# Patient Record
Sex: MALE | Race: WHITE | Employment: OTHER | URBAN - METROPOLITAN AREA
[De-identification: names, ages, dates, MRNs, and addresses within clinical notes are randomized per-mention and may not be internally consistent; named-entity substitution may affect disease eponyms.]

---

## 2019-12-15 NOTE — PROGRESS NOTES
Called to pre-assess for BEAR/Cardioversion with Dr Saranya Chawla , Scheduled 12/16/19.  No answer & no voicemail set up - HOme # [phone] - is a wrong number

## 2019-12-16 ENCOUNTER — HOSPITAL ENCOUNTER (OUTPATIENT)
Dept: CARDIAC CATH/INVASIVE PROCEDURES | Age: 84
Discharge: HOME | End: 2019-12-16
Attending: INTERNAL MEDICINE | Admitting: INTERNAL MEDICINE
Payer: COMMERCIAL

## 2019-12-16 VITALS
OXYGEN SATURATION: 96 % | HEIGHT: 70 IN | RESPIRATION RATE: 16 BRPM | DIASTOLIC BLOOD PRESSURE: 103 MMHG | SYSTOLIC BLOOD PRESSURE: 134 MMHG | HEART RATE: 77 BPM | BODY MASS INDEX: 20.81 KG/M2

## 2019-12-16 LAB
ALBUMIN SERPL-MCNC: 3.4 G/DL (ref 3.2–4.6)
ALBUMIN/GLOB SERPL: 0.9 {RATIO} (ref 1.2–3.5)
ALP SERPL-CCNC: 61 U/L (ref 50–136)
ALT SERPL-CCNC: 18 U/L (ref 12–65)
ANION GAP SERPL CALC-SCNC: 5 MMOL/L (ref 7–16)
AST SERPL-CCNC: 25 U/L (ref 15–37)
ATRIAL RATE: 441 BPM
ATRIAL RATE: 77 BPM
BILIRUB SERPL-MCNC: 0.7 MG/DL (ref 0.2–1.1)
BUN SERPL-MCNC: 30 MG/DL (ref 8–23)
CALCIUM SERPL-MCNC: 9.2 MG/DL (ref 8.3–10.4)
CALCULATED P AXIS, ECG09: 107 DEGREES
CALCULATED P AXIS, ECG09: 84 DEGREES
CALCULATED R AXIS, ECG10: -61 DEGREES
CALCULATED R AXIS, ECG10: -65 DEGREES
CALCULATED T AXIS, ECG11: 93 DEGREES
CALCULATED T AXIS, ECG11: 93 DEGREES
CHLORIDE SERPL-SCNC: 105 MMOL/L (ref 98–107)
CO2 SERPL-SCNC: 28 MMOL/L (ref 21–32)
CREAT SERPL-MCNC: 1.74 MG/DL (ref 0.8–1.5)
DIAGNOSIS, 93000: NORMAL
DIAGNOSIS, 93000: NORMAL
ERYTHROCYTE [DISTWIDTH] IN BLOOD BY AUTOMATED COUNT: 17.2 % (ref 11.9–14.6)
GLOBULIN SER CALC-MCNC: 4 G/DL (ref 2.3–3.5)
GLUCOSE SERPL-MCNC: 95 MG/DL (ref 65–100)
HCT VFR BLD AUTO: 40.6 % (ref 41.1–50.3)
HGB BLD-MCNC: 12.9 G/DL (ref 13.6–17.2)
INR PPP: 1.5
MAGNESIUM SERPL-MCNC: 2 MG/DL (ref 1.8–2.4)
MCH RBC QN AUTO: 30.4 PG (ref 26.1–32.9)
MCHC RBC AUTO-ENTMCNC: 31.8 G/DL (ref 31.4–35)
MCV RBC AUTO: 95.8 FL (ref 79.6–97.8)
NRBC # BLD: 0 K/UL (ref 0–0.2)
P-R INTERVAL, ECG05: 276 MS
PLATELET # BLD AUTO: 228 K/UL (ref 150–450)
PMV BLD AUTO: 9.5 FL (ref 9.4–12.3)
POTASSIUM SERPL-SCNC: 4.6 MMOL/L (ref 3.5–5.1)
PROT SERPL-MCNC: 7.4 G/DL (ref 6.3–8.2)
PROTHROMBIN TIME: 18.5 SEC (ref 11.7–14.5)
Q-T INTERVAL, ECG07: 430 MS
Q-T INTERVAL, ECG07: 442 MS
QRS DURATION, ECG06: 138 MS
QRS DURATION, ECG06: 142 MS
QTC CALCULATION (BEZET), ECG08: 483 MS
QTC CALCULATION (BEZET), ECG08: 500 MS
RBC # BLD AUTO: 4.24 M/UL (ref 4.23–5.6)
SODIUM SERPL-SCNC: 138 MMOL/L (ref 136–145)
VENTRICULAR RATE, ECG03: 76 BPM
VENTRICULAR RATE, ECG03: 77 BPM
WBC # BLD AUTO: 6.7 K/UL (ref 4.3–11.1)

## 2019-12-16 PROCEDURE — 92960 CARDIOVERSION ELECTRIC EXT: CPT

## 2019-12-16 PROCEDURE — 99153 MOD SED SAME PHYS/QHP EA: CPT

## 2019-12-16 PROCEDURE — 93005 ELECTROCARDIOGRAM TRACING: CPT | Performed by: INTERNAL MEDICINE

## 2019-12-16 PROCEDURE — 74011250636 HC RX REV CODE- 250/636: Performed by: INTERNAL MEDICINE

## 2019-12-16 PROCEDURE — 77030009397 HC ELECTRD ECG PACE ZOLL -B

## 2019-12-16 PROCEDURE — 93312 ECHO TRANSESOPHAGEAL: CPT

## 2019-12-16 PROCEDURE — 85610 PROTHROMBIN TIME: CPT

## 2019-12-16 PROCEDURE — 74011250637 HC RX REV CODE- 250/637: Performed by: INTERNAL MEDICINE

## 2019-12-16 PROCEDURE — 80053 COMPREHEN METABOLIC PANEL: CPT

## 2019-12-16 PROCEDURE — 99152 MOD SED SAME PHYS/QHP 5/>YRS: CPT

## 2019-12-16 PROCEDURE — 83735 ASSAY OF MAGNESIUM: CPT

## 2019-12-16 PROCEDURE — 85027 COMPLETE CBC AUTOMATED: CPT

## 2019-12-16 RX ORDER — SODIUM CHLORIDE 9 MG/ML
75 INJECTION, SOLUTION INTRAVENOUS CONTINUOUS
Status: DISCONTINUED | OUTPATIENT
Start: 2019-12-16 | End: 2019-12-16 | Stop reason: HOSPADM

## 2019-12-16 RX ORDER — MIDAZOLAM HYDROCHLORIDE 1 MG/ML
.5-5 INJECTION, SOLUTION INTRAMUSCULAR; INTRAVENOUS
Status: DISCONTINUED | OUTPATIENT
Start: 2019-12-16 | End: 2019-12-16 | Stop reason: HOSPADM

## 2019-12-16 RX ADMIN — MIDAZOLAM 2 MG: 1 INJECTION INTRAMUSCULAR; INTRAVENOUS at 09:05

## 2019-12-16 RX ADMIN — APIXABAN 2.5 MG: 2.5 TABLET, FILM COATED ORAL at 08:28

## 2019-12-16 RX ADMIN — MIDAZOLAM 1 MG: 1 INJECTION INTRAMUSCULAR; INTRAVENOUS at 09:32

## 2019-12-16 NOTE — PROGRESS NOTES
BEAR/CVN DR Raffy Cardenas  ASA II Mallampati II  Versed 3mg  One shock @200j synced  Now nsr  Pt tolerated procedure well

## 2019-12-16 NOTE — DISCHARGE INSTRUCTIONS
AFTER YOU TRANSESOPHAGEAL ECHOCARDIOGRAM    Be sure someone else drives you home. You may feel drowsy for several hours. Do not eat or drink for at least two hours after your procedure. Your throat will be numb and there is a risk you might have difficulty swallowing for a while. Be careful when you do eat or drink for the first time especially with hot fluids since you could easily burn your throat. Call your doctor if:    · You are bleeding from your throat or mouth. · You have trouble breathing all of a sudden. · You have chest pain or any pain that spreads to your neck, jaw, or arms. · You have questions or concerns. · You have a fever greater than 101°F.    Doctor: [facility] Cardiology [phone]    Special Instructions:    No driving for 24 hours. Patient Education        Electrical Cardioversion: What to Expect at Home  Your Recovery    Electrical cardioversion is a treatment for an abnormal heartbeat, such as atrial fibrillation, supraventricular tachycardia, or ventricular tachycardia (VT). It uses a brief electrical shock to reset your heart's rhythm. After cardioversion, you may have redness, like a sunburn, where the patches were. The medicines you got to make you sleepy may make you feel drowsy for the rest of the day. Your doctor may have you take medicines to help the heart beat normally and to prevent blood clots. This care sheet gives you a general idea about how long it will take for you to recover. But each person recovers at a different pace. Follow the steps below to feel better as quickly as possible. How can you care for yourself at home? Medicines    · Be safe with medicines. Take your medicines exactly as prescribed. Call your doctor if you think you are having a problem with your medicine. You may take one or more of the following medicines:  ? Rate-control medicines to slow the heart rate. These include beta-blockers, calcium channel blockers, and digoxin. ?  Rhythm control medicines that help the heart keep a normal rhythm. ? Blood thinners, also called anticoagulants, which help prevent blood clots. You will get more details on the specific medicines your doctor prescribes. Be sure you know how to take your medicines safely.     · Do not take any vitamins, over-the-counter medicines, or herbal products without talking to your doctor first.   Exercise    · Start light exercise if your doctor says that it is okay. Even a small amount will help you get stronger, have more energy, and manage your stress. Walking is an easy way to get exercise. Start out by walking a little more than you did in the hospital. Bit by bit, increase the amount you walk.     · When you exercise, watch for signs that your heart is working too hard. You are pushing too hard if you cannot talk while you are exercising. If you become short of breath or dizzy or have chest pain, sit down and rest right away.     · Check your pulse regularly. Place two fingers on the artery at the palm side of your wrist in line with your thumb. If your heartbeat seems uneven or fast, talk to your doctor. Other instructions    · Ask your doctor when you can drive again.     · Do not smoke. If you need help quitting, talk to your doctor about stop-smoking programs and medicines. These can increase your chances of quitting for good.     · Limit alcohol. Follow-up care is a key part of your treatment and safety. Be sure to make and go to all appointments, and call your doctor if you are having problems. It's also a good idea to know your test results and keep a list of the medicines you take. When should you call for help? Call 911 anytime you think you may need emergency care. For example, call if:    · You passed out (lost consciousness).     · You have chest pain or pressure. This may occur with:  ? Sweating. ? Shortness of breath. ? Nausea or vomiting.   ? Pain that spreads from the chest to the neck, jaw, or one or both shoulders or arms. ? A fast or uneven pulse. After calling 911, the  may tell you to chew 1 adult-strength or 2 to 4 low-dose aspirin. Wait for an ambulance. Do not try to drive yourself.     · You have symptoms of a stroke. These may include:  ? Sudden numbness, tingling, weakness, or loss of movement in your face, arm, or leg, especially on only one side of your body. ? Sudden vision changes. ? Sudden trouble speaking. ? Sudden confusion or trouble understanding simple statements. ? Sudden problems with walking or balance. ? A sudden, severe headache that is different from past headaches.    Call your doctor now or seek immediate medical care if:    · You feel dizzy or lightheaded, or you feel like you may faint.     · You have a fast or irregular heartbeat.    Watch closely for any changes in your health, and be sure to contact your doctor if you have any problems. Where can you learn more? Go to http://rocco-malka.info/. Enter A617 in the search box to learn more about \"Electrical Cardioversion: What to Expect at Home. \"  Current as of: April 9, 2019  Content Version: 12.2  © 8301-0177 [employer]. Care instructions adapted under license by [employer] (which disclaims liability or warranty for this information). If you have questions about a medical condition or this instruction, always ask your healthcare professional. [place] [address] any warranty or liability for your use of this information.

## 2019-12-16 NOTE — PROCEDURES
Brief Cardiac Procedure Note    Patient: Eleno Pace MRN: [id number]  SSN: [id number]    YOB: 1932  Age: 80 y.o. Sex: male      Date of Procedure: 12/16/2019     Pre-procedure Diagnosis: Atrial Fibrillation/Atrial Flutter    Post-procedure Diagnosis:  Atrial Fibrillation/Atrial Flutter    Reason for Procedure: Cardiac Arrhythmia    Procedure: Transesophageal Echocardiogram with Cardioversion    Brief Description of Procedure:     Performed By: García Nolen MD     Assistants:     Anesthesia: Moderate Sedation    Estimated Blood Loss: Less than 10 mL      Specimens: None    Implants: None    Findings:   No amado clot  nsr after 351 joules    Complications: None    Recommendations: Continue medical therapy.     Signed By: García Nolen MD     December 16, 2019

## 2019-12-16 NOTE — PROGRESS NOTES
Patient received to [address] [place] [address] room # 8  Ambulatory from [facility]. Patient scheduled for BEAR/CVN today with Dr Courtney Roes. Procedure reviewed & questions answered, voiced good understanding consent obtained & placed on chart. All medications and medical history reviewed. Will prep patient per orders. Patient & family updated on plan of care. The patient has a fraility score of 6-MODERATELY FRAIL, based on patient in rehab for falls using walker. Both he and his wife are poor historians.

## 2019-12-16 NOTE — PROGRESS NOTES
TRANSFER - IN REPORT:    Verbal report received from [facility] on Berenda Door  being received from cath lab(unit) for routine progression of care      Report consisted of patients Situation, Background, Assessment and   Recommendations(SBAR). Information from the following report(s) Procedure Summary was reviewed with the receiving nurse. Opportunity for questions and clarification was provided. Assessment completed upon patients arrival to unit and care assumed.

## 2019-12-17 NOTE — PROCEDURES
[facility]  CARDIAC CATH    Name:  Nilda Ram  MR#:  [id number]  :  1932  ACCOUNT #:  [de-identified]  DATE OF SERVICE:  2019    PROCEDURES PERFORMED:  1. Transesophageal echocardiogram.  2.  Electrical cardioversion. PREOPERATIVE DIAGNOSIS:  Persistent atrial fibrillation. POSTOPERATIVE DIAGNOSIS:  Persistent atrial fibrillation. SURGEON:  Theron Ayala MD    ASSISTANT:  None. ESTIMATED BLOOD LOSS:  None. SPECIMENS REMOVED:  None. COMPLICATIONS:  None. IMPLANTS:  None. ANESTHESIA:  Conscious sedation administered by Judie Love RN. HISTORY:  This is an 77-year-old gentleman with persistent poorly controlled atrial fibrillation. PROCEDURE:  After adequate oropharyngeal topical anesthesia with Cetacaine, the patient was given intravenous Versed. In spite of a persistent effort, the transesophageal probe would not advance into the esophagus. This was changed out for a pediatric probe which was able to be positioned into the mid-esophagus. A limited transesophageal echocardiogram was able to be performed. The left atrium was well visualized and there was no clot identified. See the separate report for details. After the confirmation of no clot in the left atrial appendage, the patient was shocked from atrial fibrillation to normal sinus rhythm with 200 joules. He tolerated it well.       Reggie Carver MD      [id number]  D:  2019 12:46  T:  2019 6:45  JOB #:  [id number]

## 2020-03-16 ENCOUNTER — HOSPITAL ENCOUNTER (OUTPATIENT)
Dept: LAB | Age: 85
Discharge: HOME | End: 2020-03-16
Payer: MEDICARE

## 2020-03-16 DIAGNOSIS — I50.20 HEART FAILURE WITH REDUCED LEFT VENTRICULAR FUNCTION (HCC): ICD-10-CM

## 2020-03-16 LAB
ANION GAP SERPL CALC-SCNC: 5 MMOL/L (ref 7–16)
BNP SERPL-MCNC: 359 PG/ML
BUN SERPL-MCNC: 29 MG/DL (ref 8–23)
CALCIUM SERPL-MCNC: 8.7 MG/DL (ref 8.3–10.4)
CHLORIDE SERPL-SCNC: 102 MMOL/L (ref 98–107)
CO2 SERPL-SCNC: 31 MMOL/L (ref 21–32)
CREAT SERPL-MCNC: 2 MG/DL (ref 0.8–1.5)
GLUCOSE SERPL-MCNC: 78 MG/DL (ref 65–100)
POTASSIUM SERPL-SCNC: 4.3 MMOL/L (ref 3.5–5.1)
SODIUM SERPL-SCNC: 138 MMOL/L (ref 136–145)

## 2020-03-16 PROCEDURE — 36415 COLL VENOUS BLD VENIPUNCTURE: CPT

## 2020-03-16 PROCEDURE — 80048 BASIC METABOLIC PNL TOTAL CA: CPT

## 2020-03-16 PROCEDURE — 83880 ASSAY OF NATRIURETIC PEPTIDE: CPT

## 2020-06-26 ENCOUNTER — HOSPITAL ENCOUNTER (OUTPATIENT)
Dept: LAB | Age: 85
Discharge: HOME | End: 2020-06-26
Payer: MEDICARE

## 2020-06-26 DIAGNOSIS — I48.0 PAROXYSMAL ATRIAL FIBRILLATION (HCC): ICD-10-CM

## 2020-06-26 LAB
ALBUMIN SERPL-MCNC: 3.9 G/DL (ref 3.2–4.6)
ALBUMIN/GLOB SERPL: 1.1 {RATIO} (ref 1.2–3.5)
ALP SERPL-CCNC: 66 U/L (ref 50–136)
ALT SERPL-CCNC: 28 U/L (ref 12–65)
ANION GAP SERPL CALC-SCNC: 4 MMOL/L (ref 7–16)
AST SERPL-CCNC: 26 U/L (ref 15–37)
BILIRUB SERPL-MCNC: 0.8 MG/DL (ref 0.2–1.1)
BUN SERPL-MCNC: 33 MG/DL (ref 8–23)
CALCIUM SERPL-MCNC: 8.5 MG/DL (ref 8.3–10.4)
CHLORIDE SERPL-SCNC: 104 MMOL/L (ref 98–107)
CO2 SERPL-SCNC: 29 MMOL/L (ref 21–32)
CREAT SERPL-MCNC: 2.12 MG/DL (ref 0.8–1.5)
GLOBULIN SER CALC-MCNC: 3.6 G/DL (ref 2.3–3.5)
GLUCOSE SERPL-MCNC: 111 MG/DL (ref 65–100)
POTASSIUM SERPL-SCNC: 4 MMOL/L (ref 3.5–5.1)
PROT SERPL-MCNC: 7.5 G/DL (ref 6.3–8.2)
SODIUM SERPL-SCNC: 137 MMOL/L (ref 136–145)
TSH SERPL DL<=0.005 MIU/L-ACNC: 18.5 UIU/ML (ref 0.36–3.74)

## 2020-06-26 PROCEDURE — 84443 ASSAY THYROID STIM HORMONE: CPT

## 2020-06-26 PROCEDURE — 80053 COMPREHEN METABOLIC PANEL: CPT

## 2020-06-26 PROCEDURE — 36415 COLL VENOUS BLD VENIPUNCTURE: CPT

## 2020-08-04 ENCOUNTER — HOSPITAL ENCOUNTER (OUTPATIENT)
Dept: LAB | Age: 85
Discharge: HOME | End: 2020-08-04
Payer: MEDICARE

## 2020-08-04 DIAGNOSIS — Z79.899 LONG TERM CURRENT USE OF AMIODARONE: ICD-10-CM

## 2020-08-04 LAB — TSH SERPL DL<=0.005 MIU/L-ACNC: 9.66 UIU/ML (ref 0.36–3.74)

## 2020-08-04 PROCEDURE — 36415 COLL VENOUS BLD VENIPUNCTURE: CPT

## 2020-08-04 PROCEDURE — 84443 ASSAY THYROID STIM HORMONE: CPT

## 2020-09-10 ENCOUNTER — HOSPITAL ENCOUNTER (OUTPATIENT)
Dept: LAB | Age: 85
Discharge: HOME | End: 2020-09-10
Payer: MEDICARE

## 2020-09-10 DIAGNOSIS — R79.89 ELEVATED TSH: ICD-10-CM

## 2020-09-10 DIAGNOSIS — Z79.899 LONG TERM CURRENT USE OF AMIODARONE: ICD-10-CM

## 2020-09-10 LAB — TSH SERPL DL<=0.005 MIU/L-ACNC: 9.24 UIU/ML (ref 0.36–3.74)

## 2020-09-10 PROCEDURE — 36415 COLL VENOUS BLD VENIPUNCTURE: CPT

## 2020-09-10 PROCEDURE — 84443 ASSAY THYROID STIM HORMONE: CPT

## 2020-12-30 ENCOUNTER — HOSPITAL ENCOUNTER (OUTPATIENT)
Dept: LAB | Age: 85
Discharge: HOME | End: 2020-12-30
Payer: MEDICARE

## 2020-12-30 DIAGNOSIS — I48.0 PAROXYSMAL ATRIAL FIBRILLATION (HCC): ICD-10-CM

## 2020-12-30 DIAGNOSIS — R79.89 ELEVATED TSH: ICD-10-CM

## 2020-12-30 DIAGNOSIS — Z79.899 LONG TERM CURRENT USE OF AMIODARONE: ICD-10-CM

## 2020-12-30 LAB — TSH SERPL DL<=0.005 MIU/L-ACNC: 6.64 UIU/ML (ref 0.36–3.74)

## 2020-12-30 PROCEDURE — 84443 ASSAY THYROID STIM HORMONE: CPT

## 2020-12-30 PROCEDURE — 36415 COLL VENOUS BLD VENIPUNCTURE: CPT

## 2021-01-04 ENCOUNTER — TRANSCRIBE ORDER (OUTPATIENT)
Dept: SCHEDULING | Age: 86
End: 2021-01-04

## 2021-01-04 ENCOUNTER — HOSPITAL ENCOUNTER (OUTPATIENT)
Dept: ULTRASOUND IMAGING | Age: 86
Discharge: HOME | End: 2021-01-04
Attending: INTERNAL MEDICINE

## 2021-01-04 DIAGNOSIS — R74.8 ELEVATED LIVER ENZYMES: Primary | ICD-10-CM

## 2021-01-04 DIAGNOSIS — R74.8 ELEVATED LIVER ENZYMES: ICD-10-CM

## 2021-04-27 ENCOUNTER — HOSPITAL ENCOUNTER (OUTPATIENT)
Dept: LAB | Age: 86
Discharge: HOME | End: 2021-04-27
Payer: MEDICARE

## 2021-04-27 DIAGNOSIS — R79.89 ELEVATED TSH: ICD-10-CM

## 2021-04-27 DIAGNOSIS — R74.8 ELEVATED LIVER ENZYMES: ICD-10-CM

## 2021-04-27 LAB
ALBUMIN SERPL-MCNC: 3.8 G/DL (ref 3.2–4.6)
ALBUMIN/GLOB SERPL: 1.2 {RATIO} (ref 1.2–3.5)
ALP SERPL-CCNC: 69 U/L (ref 50–136)
ALT SERPL-CCNC: 19 U/L (ref 12–65)
ANION GAP SERPL CALC-SCNC: 6 MMOL/L (ref 7–16)
AST SERPL-CCNC: 19 U/L (ref 15–37)
BILIRUB SERPL-MCNC: 0.6 MG/DL (ref 0.2–1.1)
BUN SERPL-MCNC: 37 MG/DL (ref 8–23)
CALCIUM SERPL-MCNC: 8.6 MG/DL (ref 8.3–10.4)
CHLORIDE SERPL-SCNC: 104 MMOL/L (ref 98–107)
CO2 SERPL-SCNC: 27 MMOL/L (ref 21–32)
CREAT SERPL-MCNC: 2.38 MG/DL (ref 0.8–1.5)
GLOBULIN SER CALC-MCNC: 3.2 G/DL (ref 2.3–3.5)
GLUCOSE SERPL-MCNC: 115 MG/DL (ref 65–100)
POTASSIUM SERPL-SCNC: 4.2 MMOL/L (ref 3.5–5.1)
PROT SERPL-MCNC: 7 G/DL (ref 6.3–8.2)
SODIUM SERPL-SCNC: 137 MMOL/L (ref 138–145)
TSH SERPL DL<=0.005 MIU/L-ACNC: 10.9 UIU/ML (ref 0.36–3.74)

## 2021-04-27 PROCEDURE — 80053 COMPREHEN METABOLIC PANEL: CPT

## 2021-04-27 PROCEDURE — 84443 ASSAY THYROID STIM HORMONE: CPT

## 2021-04-27 PROCEDURE — 36415 COLL VENOUS BLD VENIPUNCTURE: CPT

## 2021-09-01 ENCOUNTER — HOSPITAL ENCOUNTER (OUTPATIENT)
Dept: CARDIAC CATH/INVASIVE PROCEDURES | Age: 86
Discharge: HOME | End: 2021-09-01
Attending: INTERNAL MEDICINE | Admitting: INTERNAL MEDICINE
Payer: MEDICARE

## 2021-09-01 VITALS — SYSTOLIC BLOOD PRESSURE: 147 MMHG | HEART RATE: 87 BPM | OXYGEN SATURATION: 94 % | DIASTOLIC BLOOD PRESSURE: 104 MMHG

## 2021-09-01 DIAGNOSIS — I48.91 ATRIAL FIBRILLATION, UNSPECIFIED TYPE (HCC): ICD-10-CM

## 2021-09-01 LAB
ANION GAP SERPL CALC-SCNC: 10 MMOL/L (ref 7–16)
ATRIAL RATE: 106 BPM
BUN SERPL-MCNC: 37 MG/DL (ref 8–23)
CALCIUM SERPL-MCNC: 9.1 MG/DL (ref 8.3–10.4)
CALCULATED R AXIS, ECG10: -41 DEGREES
CALCULATED T AXIS, ECG11: 119 DEGREES
CHLORIDE SERPL-SCNC: 108 MMOL/L (ref 98–107)
CO2 SERPL-SCNC: 22 MMOL/L (ref 21–32)
CREAT SERPL-MCNC: 1.9 MG/DL (ref 0.8–1.5)
DIAGNOSIS, 93000: NORMAL
ERYTHROCYTE [DISTWIDTH] IN BLOOD BY AUTOMATED COUNT: 15.5 % (ref 11.9–14.6)
GLUCOSE SERPL-MCNC: 156 MG/DL (ref 65–100)
HCT VFR BLD AUTO: 37.9 % (ref 41.1–50.3)
HGB BLD-MCNC: 11.6 G/DL (ref 13.6–17.2)
INR PPP: 2
MAGNESIUM SERPL-MCNC: 2.4 MG/DL (ref 1.8–2.4)
MCH RBC QN AUTO: 28.3 PG (ref 26.1–32.9)
MCHC RBC AUTO-ENTMCNC: 30.6 G/DL (ref 31.4–35)
MCV RBC AUTO: 92.4 FL (ref 79.6–97.8)
NRBC # BLD: 0 K/UL (ref 0–0.2)
PLATELET # BLD AUTO: 301 K/UL (ref 150–450)
PMV BLD AUTO: 10.1 FL (ref 9.4–12.3)
POTASSIUM SERPL-SCNC: 4.5 MMOL/L (ref 3.5–5.1)
PROTHROMBIN TIME: 23.3 SEC (ref 12.6–14.5)
Q-T INTERVAL, ECG07: 388 MS
QRS DURATION, ECG06: 138 MS
QTC CALCULATION (BEZET), ECG08: 515 MS
RBC # BLD AUTO: 4.1 M/UL (ref 4.23–5.6)
SODIUM SERPL-SCNC: 140 MMOL/L (ref 136–145)
VENTRICULAR RATE, ECG03: 106 BPM
WBC # BLD AUTO: 9 K/UL (ref 4.3–11.1)

## 2021-09-01 PROCEDURE — 80048 BASIC METABOLIC PNL TOTAL CA: CPT

## 2021-09-01 PROCEDURE — 99152 MOD SED SAME PHYS/QHP 5/>YRS: CPT

## 2021-09-01 PROCEDURE — 92960 CARDIOVERSION ELECTRIC EXT: CPT

## 2021-09-01 PROCEDURE — 74011250637 HC RX REV CODE- 250/637: Performed by: INTERNAL MEDICINE

## 2021-09-01 PROCEDURE — 85610 PROTHROMBIN TIME: CPT

## 2021-09-01 PROCEDURE — 92960 CARDIOVERSION ELECTRIC EXT: CPT | Performed by: INTERNAL MEDICINE

## 2021-09-01 PROCEDURE — 85027 COMPLETE CBC AUTOMATED: CPT

## 2021-09-01 PROCEDURE — 93005 ELECTROCARDIOGRAM TRACING: CPT | Performed by: INTERNAL MEDICINE

## 2021-09-01 PROCEDURE — 83735 ASSAY OF MAGNESIUM: CPT

## 2021-09-01 PROCEDURE — 74011250636 HC RX REV CODE- 250/636: Performed by: INTERNAL MEDICINE

## 2021-09-01 PROCEDURE — 74011000250 HC RX REV CODE- 250: Performed by: INTERNAL MEDICINE

## 2021-09-01 RX ORDER — METOPROLOL SUCCINATE 100 MG/1
100 TABLET, EXTENDED RELEASE ORAL DAILY
Qty: 30 TABLET | Refills: 3 | Status: SHIPPED | OUTPATIENT
Start: 2021-09-01 | End: 2021-10-29

## 2021-09-01 RX ORDER — MIDAZOLAM HYDROCHLORIDE 1 MG/ML
.5-2 INJECTION, SOLUTION INTRAMUSCULAR; INTRAVENOUS
Status: DISCONTINUED | OUTPATIENT
Start: 2021-09-01 | End: 2021-09-01 | Stop reason: HOSPADM

## 2021-09-01 RX ORDER — METOPROLOL TARTRATE 5 MG/5ML
5 INJECTION INTRAVENOUS ONCE
Status: COMPLETED | OUTPATIENT
Start: 2021-09-01 | End: 2021-09-01

## 2021-09-01 RX ORDER — FENTANYL CITRATE 50 UG/ML
25-50 INJECTION, SOLUTION INTRAMUSCULAR; INTRAVENOUS
Status: DISCONTINUED | OUTPATIENT
Start: 2021-09-01 | End: 2021-09-01 | Stop reason: HOSPADM

## 2021-09-01 RX ADMIN — MIDAZOLAM 2 MG: 1 INJECTION INTRAMUSCULAR; INTRAVENOUS at 10:49

## 2021-09-01 RX ADMIN — METOPROLOL TARTRATE 5 MG: 5 INJECTION INTRAVENOUS at 10:56

## 2021-09-01 RX ADMIN — DRONEDARONE 400 MG: 400 TABLET, FILM COATED ORAL at 12:09

## 2021-09-01 NOTE — DISCHARGE INSTRUCTIONS
Patient needs to take Multaq 400 mg tonight and Lopressor 50 mg. Tomorrow increase Lopressor to 100 mg (2 tabs)    Sedation for a Medical Procedure: Care Instructions     You were given a sedative medication during your visit. While many of the effects will have worn   off before you leave; you may continue to feel some effects for several hours. Common side effects from sedation include:  · Feeling sleepy. (Your doctors and nurses will make sure you are not too sleepy to go home.)  · Nausea and vomiting. This usually does not last long. · Feeling tired. How can you care for yourself at home? Activity    · Don't do anything for 24 hours that requires attention to detail. It takes time for the medicine effects to completely wear off. · Do not make important legal decisions for 24 hours. · Do not sign any legal documents for 24 hours. · Do not drink alcohol today     · For your safety, you should not drive or operate heavy machinery for the remainder of the day     · Rest when you feel tired. Getting enough sleep will help you recover. Diet    · You can eat your normal diet, unless your doctor gives you other instructions. If your stomach is upset, try clear liquids and bland, low-fat foods like plain toast or rice. · Drink plenty of fluids (unless your doctor tells you not to). · Don't drink alcohol for 24 hours. Medicines    · Be safe with medicines. Read and follow all instructions on the label. · If the doctor gave you a prescription medicine for pain, take it as prescribed. · If you are not taking a prescription pain medicine, ask your doctor if you can take an over-the-counter medicine. · If you think your pain medicine is making you sick to your stomach:  · Take your medicine after meals (unless your doctor has told you not to). · Ask your doctor for a different pain medicine.      Patient Education        Electrical Cardioversion: What to Expect at Home  Your Recovery     Electrical cardioversion is a treatment for an abnormal heartbeat, such as atrial fibrillation, supraventricular tachycardia, or ventricular tachycardia (VT). Your doctor used a brief electrical shock to reset your heart's rhythm. After the procedure, you may have redness, like a sunburn, where the patches were. The medicines you got to make you sleepy may make you feel drowsy for the rest of the day. Your doctor may have you take medicines to help the heart beat normally and to prevent blood clots. This care sheet gives you a general idea about how long it will take for you to recover. But each person recovers at a different pace. Follow the steps below to feel better as quickly as possible. How can you care for yourself at home? Medicines    · Be safe with medicines. Take your medicines exactly as prescribed. Call your doctor if you think you are having a problem with your medicine. You may take one or more of the following medicines:  ? Rate-control medicines to slow the heart rate. These include beta-blockers, calcium channel blockers, and digoxin. ? Rhythm control medicines that help the heart keep a normal rhythm. ? Blood thinners, also called anticoagulants, which help prevent blood clots. You will get more details on the specific medicines your doctor prescribes. Be sure you know how to take your medicines safely.     · Do not take any vitamins, over-the-counter medicines, or herbal products without talking to your doctor first.   Exercise    · Start light exercise if your doctor says that it's okay. Even a small amount will help you get stronger, have more energy, and manage your stress. Walking is an easy way to get exercise. Start out by walking a little more than you did in the hospital. Bit by bit, increase the amount you walk.     · When you exercise, watch for signs that your heart is working too hard. You are pushing too hard if you cannot talk while you are exercising.  If you become short of breath or dizzy or have chest pain, sit down and rest right away.     · Check your pulse regularly. Place two fingers on the artery at the palm side of your wrist in line with your thumb. If your heartbeat seems uneven or fast, talk to your doctor. Other instructions    · Ask your doctor when you can drive again.     · Do not smoke. If you need help quitting, talk to your doctor about stop-smoking programs and medicines. These can increase your chances of quitting for good.     · Limit alcohol. Follow-up care is a key part of your treatment and safety. Be sure to make and go to all appointments, and call your doctor if you are having problems. It's also a good idea to know your test results and keep a list of the medicines you take. When should you call for help? Call 911 anytime you think you may need emergency care. For example, call if:    · You passed out (lost consciousness).     · You have chest pain or pressure. This may occur with:  ? Sweating. ? Shortness of breath. ? Nausea or vomiting. ? Pain that spreads from the chest to the neck, jaw, or one or both shoulders or arms. ? A fast or uneven pulse. After calling 911, the  may tell you to chew 1 adult-strength or 2 to 4 low-dose aspirin. Wait for an ambulance. Do not try to drive yourself.     · You have symptoms of a stroke. These may include:  ? Sudden numbness, tingling, weakness, or loss of movement in your face, arm, or leg, especially on only one side of your body. ? Sudden vision changes. ? Sudden trouble speaking. ? Sudden confusion or trouble understanding simple statements. ? Sudden problems with walking or balance. ? A sudden, severe headache that is different from past headaches. Call your doctor now or seek immediate medical care if:    · You feel dizzy or lightheaded, or you feel like you may faint.     · You have a fast or irregular heartbeat.    Watch closely for any changes in your health, and be sure to contact your doctor if you have any problems. Where can you learn more? Go to http://www.gray.com/  Enter A617 in the search box to learn more about \"Electrical Cardioversion: What to Expect at Home. \"  Current as of: August 31, 2020               Content Version: 12.8  © 7275-1379 [employer]. Care instructions adapted under license by [employer] (which disclaims liability or warranty for this information). If you have questions about a medical condition or this instruction, always ask your healthcare professional. [address] any warranty or liability for your use of this information.

## 2021-09-01 NOTE — PROGRESS NOTES
Patient awake and alert post CVN. See cupid. Discharge instructions reviewed with patient and son including new medications and medications that need to be taken tonight. INT removed with cath intact.

## 2021-09-27 PROBLEM — N18.30 STAGE 3 CHRONIC KIDNEY DISEASE (HCC): Status: ACTIVE | Noted: 2021-09-27

## 2021-10-01 ENCOUNTER — APPOINTMENT (OUTPATIENT)
Dept: GENERAL RADIOLOGY | Age: 86
End: 2021-10-01
Attending: NURSE PRACTITIONER
Payer: MEDICARE

## 2021-10-01 ENCOUNTER — HOSPITAL ENCOUNTER (OUTPATIENT)
Age: 86
Setting detail: OUTPATIENT SURGERY
Discharge: HOME | End: 2021-10-01
Attending: INTERNAL MEDICINE | Admitting: INTERNAL MEDICINE
Payer: MEDICARE

## 2021-10-01 VITALS
HEART RATE: 60 BPM | WEIGHT: 150 LBS | DIASTOLIC BLOOD PRESSURE: 65 MMHG | BODY MASS INDEX: 21.47 KG/M2 | TEMPERATURE: 98.6 F | HEIGHT: 70 IN | OXYGEN SATURATION: 97 % | SYSTOLIC BLOOD PRESSURE: 134 MMHG

## 2021-10-01 DIAGNOSIS — I48.92 ATRIAL FLUTTER, UNSPECIFIED TYPE (HCC): ICD-10-CM

## 2021-10-01 PROBLEM — I48.91 A-FIB (HCC): Status: ACTIVE | Noted: 2021-10-01

## 2021-10-01 LAB
ANION GAP SERPL CALC-SCNC: 6 MMOL/L (ref 7–16)
ATRIAL RATE: 288 BPM
BUN SERPL-MCNC: 34 MG/DL (ref 8–23)
CALCIUM SERPL-MCNC: 8.8 MG/DL (ref 8.3–10.4)
CALCULATED P AXIS, ECG09: 83 DEGREES
CALCULATED R AXIS, ECG10: -34 DEGREES
CALCULATED T AXIS, ECG11: 85 DEGREES
CHLORIDE SERPL-SCNC: 107 MMOL/L (ref 98–107)
CO2 SERPL-SCNC: 27 MMOL/L (ref 21–32)
CREAT SERPL-MCNC: 1.49 MG/DL (ref 0.8–1.5)
DIAGNOSIS, 93000: NORMAL
ERYTHROCYTE [DISTWIDTH] IN BLOOD BY AUTOMATED COUNT: 16.8 % (ref 11.9–14.6)
GLUCOSE SERPL-MCNC: 91 MG/DL (ref 65–100)
HCT VFR BLD AUTO: 35.8 % (ref 41.1–50.3)
HGB BLD-MCNC: 11.1 G/DL (ref 13.6–17.2)
MAGNESIUM SERPL-MCNC: 2.2 MG/DL (ref 1.8–2.4)
MCH RBC QN AUTO: 28.7 PG (ref 26.1–32.9)
MCHC RBC AUTO-ENTMCNC: 31 G/DL (ref 31.4–35)
MCV RBC AUTO: 92.5 FL (ref 79.6–97.8)
NRBC # BLD: 0 K/UL (ref 0–0.2)
PLATELET # BLD AUTO: 215 K/UL (ref 150–450)
PMV BLD AUTO: 10.2 FL (ref 9.4–12.3)
POTASSIUM SERPL-SCNC: 4.4 MMOL/L (ref 3.5–5.1)
Q-T INTERVAL, ECG07: 426 MS
QRS DURATION, ECG06: 128 MS
QTC CALCULATION (BEZET), ECG08: 460 MS
RBC # BLD AUTO: 3.87 M/UL (ref 4.23–5.6)
SODIUM SERPL-SCNC: 140 MMOL/L (ref 136–145)
VENTRICULAR RATE, ECG03: 70 BPM
WBC # BLD AUTO: 6.2 K/UL (ref 4.3–11.1)

## 2021-10-01 PROCEDURE — C1769 GUIDE WIRE: HCPCS | Performed by: INTERNAL MEDICINE

## 2021-10-01 PROCEDURE — L3650 SO 8 ABD RESTRAINT PRE OTS: HCPCS | Performed by: INTERNAL MEDICINE

## 2021-10-01 PROCEDURE — 85027 COMPLETE CBC AUTOMATED: CPT

## 2021-10-01 PROCEDURE — 74011250636 HC RX REV CODE- 250/636: Performed by: INTERNAL MEDICINE

## 2021-10-01 PROCEDURE — 77030031139 HC SUT VCRL2 J&J -A: Performed by: INTERNAL MEDICINE

## 2021-10-01 PROCEDURE — 83735 ASSAY OF MAGNESIUM: CPT

## 2021-10-01 PROCEDURE — 33208 INSRT HEART PM ATRIAL & VENT: CPT | Performed by: INTERNAL MEDICINE

## 2021-10-01 PROCEDURE — 33225 L VENTRIC PACING LEAD ADD-ON: CPT | Performed by: INTERNAL MEDICINE

## 2021-10-01 PROCEDURE — 80048 BASIC METABOLIC PNL TOTAL CA: CPT

## 2021-10-01 PROCEDURE — 77030018579 HC SUT TICRN1 COVD -B: Performed by: INTERNAL MEDICINE

## 2021-10-01 PROCEDURE — 77030041279 HC DRSG PRMSL AG MDII -B: Performed by: INTERNAL MEDICINE

## 2021-10-01 PROCEDURE — C1900 LEAD, CORONARY VENOUS: HCPCS | Performed by: INTERNAL MEDICINE

## 2021-10-01 PROCEDURE — 74011000258 HC RX REV CODE- 258: Performed by: INTERNAL MEDICINE

## 2021-10-01 PROCEDURE — 93005 ELECTROCARDIOGRAM TRACING: CPT | Performed by: INTERNAL MEDICINE

## 2021-10-01 PROCEDURE — C1893 INTRO/SHEATH, FIXED,NON-PEEL: HCPCS | Performed by: INTERNAL MEDICINE

## 2021-10-01 PROCEDURE — 74011250637 HC RX REV CODE- 250/637: Performed by: INTERNAL MEDICINE

## 2021-10-01 PROCEDURE — C1894 INTRO/SHEATH, NON-LASER: HCPCS | Performed by: INTERNAL MEDICINE

## 2021-10-01 PROCEDURE — 33207 INSERT HEART PM VENTRICULAR: CPT | Performed by: INTERNAL MEDICINE

## 2021-10-01 PROCEDURE — C2621 PMKR, OTHER THAN SING/DUAL: HCPCS | Performed by: INTERNAL MEDICINE

## 2021-10-01 PROCEDURE — C1892 INTRO/SHEATH,FIXED,PEEL-AWAY: HCPCS | Performed by: INTERNAL MEDICINE

## 2021-10-01 PROCEDURE — C1898 LEAD, PMKR, OTHER THAN TRANS: HCPCS | Performed by: INTERNAL MEDICINE

## 2021-10-01 PROCEDURE — 74011000250 HC RX REV CODE- 250: Performed by: INTERNAL MEDICINE

## 2021-10-01 PROCEDURE — 71045 X-RAY EXAM CHEST 1 VIEW: CPT

## 2021-10-01 PROCEDURE — 77030022513 HC GD NDL ACUSIT CIVC -B: Performed by: INTERNAL MEDICINE

## 2021-10-01 PROCEDURE — 74011000636 HC RX REV CODE- 636: Performed by: INTERNAL MEDICINE

## 2021-10-01 DEVICE — IMPLANTABLE DEVICE
Type: IMPLANTABLE DEVICE | Status: FUNCTIONAL
Brand: SOLIA S 60

## 2021-10-01 DEVICE — IMPLANTABLE DEVICE
Type: IMPLANTABLE DEVICE | Status: FUNCTIONAL
Brand: EDORA 8 HF-T QP

## 2021-10-01 DEVICE — IMPLANTABLE DEVICE
Type: IMPLANTABLE DEVICE | Status: FUNCTIONAL
Brand: SENTUS PROMRI OTW QP L-85/49

## 2021-10-01 RX ORDER — ACETAMINOPHEN 500 MG
1000 TABLET ORAL
Status: COMPLETED | OUTPATIENT
Start: 2021-10-01 | End: 2021-10-01

## 2021-10-01 RX ORDER — CEFAZOLIN SODIUM/WATER 2 G/20 ML
2 SYRINGE (ML) INTRAVENOUS ONCE
Status: COMPLETED | OUTPATIENT
Start: 2021-10-01 | End: 2021-10-01

## 2021-10-01 RX ORDER — SODIUM CHLORIDE 9 MG/ML
75 INJECTION, SOLUTION INTRAVENOUS CONTINUOUS
Status: DISCONTINUED | OUTPATIENT
Start: 2021-10-01 | End: 2021-10-01 | Stop reason: HOSPADM

## 2021-10-01 RX ORDER — LIDOCAINE HYDROCHLORIDE 10 MG/ML
INJECTION, SOLUTION EPIDURAL; INFILTRATION; INTRACAUDAL; PERINEURAL AS NEEDED
Status: DISCONTINUED | OUTPATIENT
Start: 2021-10-01 | End: 2021-10-01 | Stop reason: HOSPADM

## 2021-10-01 RX ADMIN — ACETAMINOPHEN 1000 MG: 500 TABLET, FILM COATED ORAL at 13:56

## 2021-10-01 RX ADMIN — CEFAZOLIN SODIUM 1 G: 1 INJECTION, POWDER, FOR SOLUTION INTRAMUSCULAR; INTRAVENOUS at 16:15

## 2021-10-01 NOTE — Clinical Note
ID band present and verified. Family is in the waiting area. ANDREW FISCHER  50y  Female  ***My note supersedes ALL resident notes that I sign.  My corrections for their notes are in my note.***    I can be reached directly on [employer] [address]. My office number is [phone]. My personal cell number is [phone].    INTERVAL EVENTS: Here for f/u of COPD exac. Pt now more committed to d/c tobacco. 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  The basements in the apartment buildings were hot, humid, kacie and it was not uncommon to encounter garbage, sewage and rat droppings.  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 Pt is going to retire from that job and work elsewhere, perhaps [employer] de[employer].  Pt has 8 yo son at home and would like to get back to him. I told her that there is no medication that fixes the smoking and the environmental exposure - she needs to change this.    Pt used to smoke 2 packs of menthol cigs/day. Pt now down to "ultra-light" filtered cigs and 1/2 PPD.  While this is better, I told her she needs to quit completely. Pt contacted 311 and is getting nicotine replacement lozenges and gum to help her finish quitting.    T(F): 97.4 (12-19-18 @ 20:00), Max: 98.2 (12-19-18 @ 19:47)  HR: 106 (12-19-18 @ 20:00) (71 - 106)  BP: 155/83 (12-19-18 @ 20:00) (138/78 - 155/83)  RR: 18 (12-19-18 @ 20:00) (18 - 20)  SpO2: 97% (12-19-18 @ 20:00) (97% - 97%)    GENERAL: NAD, well-developed, AAOx3  HEENT:  Atraumatic, Normocephalic.   PULMONARY: + wheezing, not using accessory muscles; speaks in full sentences; very limited coughing  CARDIOVASCULAR: mildly tachycardic, S1/S2,  No murmurs  GASTROINTESTINAL: Soft, Nontender, Nondistended; Bowel sounds present  MUSCULOSKELETAL:  No edema, no c/c  NEUROLOGY: non-focal, ambulating (able to go outside to smoke)    LABS:                        10.1    (    88.9   12.03 )-----------( ---------      344      ( 20 Dec 2018 07:10 )             31.9    (    17.3     on steroids    142   (   100   (   125      12-20-18 @ 07:10  ----------------------               4.1   (   24   (   9                             -----                        0.8  Ca  9.3   Mg  1.8    P   --     CARDIAC MARKERS ( 19 Dec 2018 08:47 )  x     / <0.01 ng/mL / 213 U/L / x     / 5.2 ng/mL  CARDIAC MARKERS ( 18 Dec 2018 16:25 )  x     / <0.01 ng/mL / x     / x     / x        RADIOLOGY & ADDITIONAL TESTS:  < from: Xray Chest 1 View- PORTABLE-Routine (12.19.18 @ 07:21) >  IMPRESSION:      No radiographic evidence of acute cardiopulmonary disease.    < end of copied text >      MEDICATIONS:  levoFLOXacin  Tablet 750 milliGRAM(s) Oral daily    ALBUTerol    90 MICROgram(s) HFA Inhaler 1 Puff(s) Inhalation every 4 hours PRN  ALBUTerol/ipratropium for Nebulization 3 milliLiter(s) Nebulizer every 6 hours  ALBUTerol/ipratropium for Nebulization. 3 milliLiter(s) Nebulizer once  buDESOnide 160 MICROgram(s)/formoterol 4.5 MICROgram(s) Inhaler 2 Puff(s) Inhalation two times a day  chlorhexidine 4% Liquid 1 Application(s) Topical <User Schedule>  fluticasone propionate 50 MICROgram(s)/spray Nasal Spray 1 Spray(s) Both Nostrils daily  methimazole 5 milliGRAM(s) Oral daily  pantoprazole    Tablet 40 milliGRAM(s) Oral before breakfast  sodium chloride 0.65% Nasal 1 Spray(s) Both Nostrils every 4 hours PRN  sodium chloride 0.9%. 1000 milliLiter(s) IV Continuous <Continuous>

## 2021-10-01 NOTE — PROGRESS NOTES
Xray completed and read, Ancef given. Discharge instructions reviewed with patient including post pacer care and limitations with left arm. Son at bedside to also her instructions. Follow up appointment in pacer clinic was made. Aquacel dressing with no bleeding or hematoma noted. INT removed with cath intact.

## 2021-10-01 NOTE — DISCHARGE SUMMARY
[facility] Cardiology Discharge Summary     Patient ID:  Pretty Casarez  [id number]  71 y.o.  12/12/1932    Admit date: 10/1/2021    Discharge date and time:  10/01/21     Admitting Physician: Yojana Toney MD     Discharge Physician: Joseph Mejia NP/Dr. ZUNILDA GONZALEZ JR. [facility]    Admission Diagnoses: Atrial flutter, unspecified type [facility]) [I48.92]    Discharge Diagnoses:   Patient Active Problem List    Diagnosis Date Noted    Stage 3 chronic kidney disease ([facility] [address] 09/27/2021    Coronary artery disease     Hypercholesterolemia     Hypertension     S/P PTCA (percutaneous transluminal coronary angioplasty)     Seizure disorder ([facility]     Atrial flutter ([facility]     Dementia [facility])        Cardiology Procedures this admission:  Pacemaker Implantation, CXR   Consults: none    Hospital Course: Patient was seen in office by Dr. ZUNILDA GONZALEZ JR. [facility] with recurrent A. Flutter. He was converted last month. Dr. ZUNILDA GONZALEZ JR. [facility] stopped multaq proceed to BIV PM then will need AVNA. Patient came in to [facility] and was taken to the cath lab by Dr. ZUNILDA GONZALEZ JR. [facility] on 10/1/2021. Pt received a Biotronik BIV pacemaker without any complications. Follow up CXR showed no pneumothorax. The afternoon post procedure, the Pt was up feeling well without any complaints of CP, SOB or palpitations. Pt's left subclavian PM site was clean, dry and intact without hematoma. Pt's labs were WNL. Pt was seen and examined by Dr. ZUNILDA GONZALEZ JR. [facility] and determined stable and ready for discharge. Pt was instructed to follow PM discharge instructions given by nursing staff. The patient will see device clinic on 10/11/2021 at 10 am in [place] office     DISPOSITION: The patient is being discharged home in stable condition on a low saturated fat, low cholesterol and low salt diet. Pt is instructed to advance activities as tolerated limited to fatigue or shortness of breath. Pt is instructed not to lift anything over 5-10 lbs with affected arm. No pushing or pulling or lifting arm above shoulder.  See complete discharge instructions. Pt is instructed to watch PM site for bleeding/oozing, if seen Pt is instructed to apply firm pressure with clean cloth and call office at [phone]. Pt is instructed to watch for signs of infection which include increasing area of redness around site, fever/hot to touch or purulent drainage. Pt is instructed to call office or return to ER for immediate evaluation of any shortness of breath, chest pain or palpitations. Discharge Exam:   Visit Vitals  BP (!) 148/90   Pulse 90   Temp 98.6 °F (37 °C)   Ht 5' 10\" (1.778 m)   Wt 68 kg (150 lb)   BMI 21.52 kg/m²    Pt has been seen by Dr. Tania Peraza: see his progress note for exam details.     Recent Results (from the past 24 hour(s))   CBC W/O DIFF    Collection Time: 10/01/21  8:08 AM   Result Value Ref Range    WBC 6.2 4.3 - 11.1 K/uL    RBC 3.87 (L) 4.23 - 5.6 M/uL    HGB 11.1 (L) 13.6 - 17.2 g/dL    HCT 35.8 (L) 41.1 - 50.3 %    MCV 92.5 79.6 - 97.8 FL    MCH 28.7 26.1 - 32.9 PG    MCHC 31.0 (L) 31.4 - 35.0 g/dL    RDW 16.8 (H) 11.9 - 14.6 %    PLATELET 331 273 - 216 K/uL    MPV 10.2 9.4 - 12.3 FL    ABSOLUTE NRBC 0.00 0.0 - 0.2 K/uL   METABOLIC PANEL, BASIC    Collection Time: 10/01/21  8:08 AM   Result Value Ref Range    Sodium 140 136 - 145 mmol/L    Potassium 4.4 3.5 - 5.1 mmol/L    Chloride 107 98 - 107 mmol/L    CO2 27 21 - 32 mmol/L    Anion gap 6 (L) 7 - 16 mmol/L    Glucose 91 65 - 100 mg/dL    BUN 34 (H) 8 - 23 MG/DL    Creatinine 1.49 0.8 - 1.5 MG/DL    GFR est AA 57 (L) >60 ml/min/1.73m2    GFR est non-AA 47 (L) >60 ml/min/1.73m2    Calcium 8.8 8.3 - 10.4 MG/DL   MAGNESIUM    Collection Time: 10/01/21  8:08 AM   Result Value Ref Range    Magnesium 2.2 1.8 - 2.4 mg/dL   EKG, 12 LEAD, INITIAL    Collection Time: 10/01/21  8:16 AM   Result Value Ref Range    Ventricular Rate 70 BPM    Atrial Rate 288 BPM    QRS Duration 128 ms    Q-T Interval 426 ms    QTC Calculation (Bezet) 460 ms    Calculated P Axis 83 degrees    Calculated R Axis -34 degrees    Calculated T Axis 85 degrees    Diagnosis       Atrial flutter with 4:1 A-V conduction  Left axis deviation  Left bundle branch block  Abnormal ECG  When compared with ECG of 17-NOV-2014 22:11,  Prior EKG likely AF  Left bundle branch block is now Present  Confirmed by Alfred Vasquez MD (), VANDANA ([id number]) on 10/1/2021 10:07:24 AM           Patient Instructions:     Current Discharge Medication List      CONTINUE these medications which have NOT CHANGED    Details   losartan (COZAAR) 100 mg tablet Take 1 Tablet by mouth daily. Qty: 30 Tablet, Refills: 3      levothyroxine (SYNTHROID) 50 mcg tablet TAKE 1 TABLET BY MOUTH DAILY BEFORE BREAKFAST  Qty: 30 Tab, Refills: 6      metoprolol succinate (TOPROL-XL) 100 mg tablet Take 1 Tablet by mouth daily. Qty: 30 Tablet, Refills: 3      apixaban (Eliquis) 2.5 mg tablet Take 1 Tab by mouth two (2) times a day.   Qty: 60 Tab, Refills: 5         STOP taking these medications       dronedarone (Multaq) tab tablet Comments:   Reason for Stopping:                 Signed:  Dario Moss NP  10/1/2021  1:37 PM

## 2021-10-01 NOTE — DISCHARGE INSTRUCTIONS
Patient Education        Pacemaker Placement: What to Expect at Home  Your Recovery     Pacemaker placement is surgery to put a pacemaker in your chest. This surgery may be done if you have bradycardia (a slow heart rate). Your doctor made a cut (incision) in your chest. The doctor put the pacemaker leads through the cut, into a large blood vessel, then into the heart. The doctor put the pacemaker under the skin of your chest and attached the leads to it. Your chest may be sore where the doctor made the cut. You also may have a bruise and mild swelling. These symptoms usually get better in 1 to 2 weeks. You may feel a hard ridge along the incision. This usually gets softer in the months after surgery. You may be able to see or feel the outline of the pacemaker under your skin. You will probably be able to go back to work or your usual routine 1 to 2 weeks after surgery. Pacemaker batteries usually last 5 to 15 years. Your doctor will talk to you about how often you will need to have your pacemaker checked. You'll need to take steps to safely use electric devices. Some of these devices can stop your pacemaker from working right for a short time. Check with your doctor about what to avoid and what to keep a short distance away from your pacemaker. For example, you will need to stay away from things with strong magnetic and electrical fields. An example is an MRI machine (unless your pacemaker is safe for an MRI). You can use a cellphone and other wireless devices, but keep them at least 6 inches away from your pacemaker. Many household and office electronics don't affect a pacemaker. These include kitchen appliances and computers. This care sheet gives you a general idea about how long it will take for you to recover. But each person recovers at a different pace. Follow the steps below to get better as quickly as possible. How can you care for yourself at home?   Activity    · Rest when you feel tired.     · Be active. Walking is a good choice.     · For 4 to 6 weeks:  ? Avoid activities that strain your chest or upper arm muscles. This includes pushing a  or vacuum, or mopping floors. It also includes swimming, or swinging a golf club or tennis racquet. ? Do not raise your arm (the one on the side of your body where the pacemaker is located) above your shoulder. ? Allow your body to heal. Don't move quickly or lift anything heavy until you are feeling better.     · Many people are able to return to work within 1 to 2 weeks after surgery.     · Ask your doctor when it is okay for you to have sex. Diet    · You can eat your normal diet. If your stomach is upset, try bland, low-fat foods like plain rice, broiled chicken, toast, and yogurt. Medicines    · Your doctor will tell you if and when you can restart your medicines. He or she will also give you instructions about taking any new medicines.     · If you take aspirin or some other blood thinner, be sure to talk to your doctor. He or she will tell you if and when to start taking this medicine again. Make sure that you understand exactly what your doctor wants you to do.     · Be safe with medicines. Read and follow all instructions on the label. ? If the doctor gave you a prescription medicine for pain, take it as prescribed. ? If you are not taking a prescription pain medicine, ask your doctor if you can take an over-the-counter medicine. ? Do not take aspirin, ibuprofen (Advil, Motrin), naproxen (Aleve), or other nonsteroidal anti-inflammatory drugs (NSAIDs) unless your doctor says it is okay.     · If your doctor prescribed antibiotics, take them as directed. Do not stop taking them just because you feel better. You need to take the full course of antibiotics. Incision care    · If you have strips of tape on the incision, leave the tape on for a week or until it falls off.     · Keep the incision dry while it heals.  Your doctor may recommend sponge baths for about 7 days, but do not get the incision wet. Your doctor will let you know when you may take showers. After a shower, pat the incision dry.     · Don't use hydrogen peroxide or alcohol on the incision, which can slow healing. You may cover the area with a gauze bandage if it oozes fluid or rubs against clothing. Change the bandage every day.     · Do not take a bath or get into a hot tub for the first 2 weeks, or until your doctor tells you it is okay. Other instructions    · Keep a medical ID card with you at all times that says you have a pacemaker. The card should include the  and model information.     · Wear medical alert jewelry stating that you have a pacemaker. You can buy this at most [employer].     · Check your pulse as directed by your doctor.     · Have your pacemaker checked as often as your doctor recommends. In some cases, this may be done over the phone or the Internet. Your doctor will give you instructions about how to do this. Follow-up care is a key part of your treatment and safety. Be sure to make and go to all appointments, and call your doctor if you are having problems. It's also a good idea to know your test results and keep a list of the medicines you take. When should you call for help? Call 911 anytime you think you may need emergency care. For example, call if:    · You passed out (lost consciousness).     · You have trouble breathing. Call your doctor now or seek immediate medical care if:    · You are dizzy or light-headed, or you feel like you may faint.     · You have pain that does not get better after you take pain medicine.     · You hear an alarm or feel a vibration from your pacemaker.     · You have loose stitches, or your incision comes open.     · Bright red blood has soaked through the bandage over your incision.     · You have signs of infection, such as:  ? Increased pain, swelling, warmth, or redness.   ? Red streaks leading from the incision. ? Pus draining from the incision. ? A fever. Watch closely for changes in your health, and be sure to contact your doctor if:    · You have any problems with your pacemaker. Where can you learn more? Go to http://www.gray.com/  Enter G550 in the search box to learn more about \"Pacemaker Placement: What to Expect at Home. \"  Current as of: April 29, 2021               Content Version: 13.0  © 2006-2021 [employer]. Care instructions adapted under license by [employer] (which disclaims liability or warranty for this information). If you have questions about a medical condition or this instruction, always ask your healthcare professional. [place] [address] any warranty or liability for your use of this information.

## 2021-10-01 NOTE — PROGRESS NOTES
TRANSFER - OUT REPORT:    Bi-V Pacer Dr Renny Kern VVI 60  Ancef 2 g  Closed with suture, staples, primaseal  No bleeding/hematoma    Verbal report given to maryan(name) on Vickie Roy  being transferred to cpru(unit) for routine progression of care       Report consisted of patients Situation, Background, Assessment and   Recommendations(SBAR). Information from the following report(s) SBAR and Procedure Summary was reviewed with the receiving nurse. Lines:   Peripheral IV 10/01/21 Left Antecubital (Active)        Opportunity for questions and clarification was provided.

## 2021-10-01 NOTE — Clinical Note
A Bovie was used. Blend setting: blend. Mode: monopolar. Site (pad location): upper leg. Laterality: right.

## 2021-10-01 NOTE — Clinical Note
Left subclavian vein. Micropuncture needle used. Using ultrasound guidance.  Number of attempts =  1.

## 2021-10-04 ENCOUNTER — HOSPITAL ENCOUNTER (OUTPATIENT)
Dept: LAB | Age: 86
Discharge: HOME | End: 2021-10-04
Payer: MEDICARE

## 2021-10-04 DIAGNOSIS — I48.4 ATYPICAL ATRIAL FLUTTER (HCC): ICD-10-CM

## 2021-10-04 LAB
ANION GAP SERPL CALC-SCNC: 5 MMOL/L (ref 7–16)
BASOPHILS # BLD: 0.1 K/UL (ref 0–0.2)
BASOPHILS NFR BLD: 1 % (ref 0–2)
BUN SERPL-MCNC: 29 MG/DL (ref 8–23)
CALCIUM SERPL-MCNC: 8.9 MG/DL (ref 8.3–10.4)
CHLORIDE SERPL-SCNC: 106 MMOL/L (ref 98–107)
CO2 SERPL-SCNC: 26 MMOL/L (ref 21–32)
CREAT SERPL-MCNC: 1.51 MG/DL (ref 0.8–1.5)
DIFFERENTIAL METHOD BLD: ABNORMAL
EOSINOPHIL # BLD: 0.2 K/UL (ref 0–0.8)
EOSINOPHIL NFR BLD: 3 % (ref 0.5–7.8)
ERYTHROCYTE [DISTWIDTH] IN BLOOD BY AUTOMATED COUNT: 17.2 % (ref 11.9–14.6)
GLUCOSE SERPL-MCNC: 92 MG/DL (ref 65–100)
HCT VFR BLD AUTO: 36 % (ref 41.1–50.3)
HGB BLD-MCNC: 10.8 G/DL (ref 13.6–17.2)
IMM GRANULOCYTES # BLD AUTO: 0.1 K/UL (ref 0–0.5)
IMM GRANULOCYTES NFR BLD AUTO: 1 % (ref 0–5)
INR PPP: 1.4
LYMPHOCYTES # BLD: 1.5 K/UL (ref 0.5–4.6)
LYMPHOCYTES NFR BLD: 21 % (ref 13–44)
MAGNESIUM SERPL-MCNC: 2.3 MG/DL (ref 1.8–2.4)
MCH RBC QN AUTO: 27.7 PG (ref 26.1–32.9)
MCHC RBC AUTO-ENTMCNC: 30 G/DL (ref 31.4–35)
MCV RBC AUTO: 92.3 FL (ref 79.6–97.8)
MONOCYTES # BLD: 0.7 K/UL (ref 0.1–1.3)
MONOCYTES NFR BLD: 9 % (ref 4–12)
NEUTS SEG # BLD: 4.6 K/UL (ref 1.7–8.2)
NEUTS SEG NFR BLD: 64 % (ref 43–78)
NRBC # BLD: 0 K/UL (ref 0–0.2)
PLATELET # BLD AUTO: 192 K/UL (ref 150–450)
PMV BLD AUTO: 10.5 FL (ref 9.4–12.3)
POTASSIUM SERPL-SCNC: 4.4 MMOL/L (ref 3.5–5.1)
PROTHROMBIN TIME: 17.4 SEC (ref 12.6–14.5)
RBC # BLD AUTO: 3.9 M/UL (ref 4.23–5.6)
SODIUM SERPL-SCNC: 137 MMOL/L (ref 138–145)
WBC # BLD AUTO: 7.2 K/UL (ref 4.3–11.1)

## 2021-10-04 PROCEDURE — 80048 BASIC METABOLIC PNL TOTAL CA: CPT

## 2021-10-04 PROCEDURE — 85610 PROTHROMBIN TIME: CPT

## 2021-10-04 PROCEDURE — 36415 COLL VENOUS BLD VENIPUNCTURE: CPT

## 2021-10-04 PROCEDURE — 83735 ASSAY OF MAGNESIUM: CPT

## 2021-10-04 PROCEDURE — 85025 COMPLETE CBC W/AUTO DIFF WBC: CPT

## 2021-10-06 ENCOUNTER — ANESTHESIA EVENT (OUTPATIENT)
Dept: CARDIAC CATH/INVASIVE PROCEDURES | Age: 86
End: 2021-10-06
Payer: MEDICARE

## 2021-10-06 RX ORDER — HYDROMORPHONE HYDROCHLORIDE 2 MG/ML
0.5 INJECTION, SOLUTION INTRAMUSCULAR; INTRAVENOUS; SUBCUTANEOUS
Status: CANCELLED | OUTPATIENT
Start: 2021-10-06

## 2021-10-06 RX ORDER — ALBUTEROL SULFATE 0.83 MG/ML
2.5 SOLUTION RESPIRATORY (INHALATION) AS NEEDED
Status: CANCELLED | OUTPATIENT
Start: 2021-10-06

## 2021-10-06 RX ORDER — ONDANSETRON 2 MG/ML
4 INJECTION INTRAMUSCULAR; INTRAVENOUS ONCE
Status: CANCELLED | OUTPATIENT
Start: 2021-10-06 | End: 2021-10-06

## 2021-10-06 RX ORDER — DIPHENHYDRAMINE HYDROCHLORIDE 50 MG/ML
12.5 INJECTION, SOLUTION INTRAMUSCULAR; INTRAVENOUS
Status: CANCELLED | OUTPATIENT
Start: 2021-10-06 | End: 2021-10-07

## 2021-10-06 RX ORDER — NALOXONE HYDROCHLORIDE 0.4 MG/ML
0.1 INJECTION, SOLUTION INTRAMUSCULAR; INTRAVENOUS; SUBCUTANEOUS AS NEEDED
Status: CANCELLED | OUTPATIENT
Start: 2021-10-06

## 2021-10-06 RX ORDER — OXYCODONE HYDROCHLORIDE 5 MG/1
10 TABLET ORAL
Status: CANCELLED | OUTPATIENT
Start: 2021-10-06 | End: 2021-10-07

## 2021-10-06 RX ORDER — OXYCODONE HYDROCHLORIDE 5 MG/1
5 TABLET ORAL
Status: CANCELLED | OUTPATIENT
Start: 2021-10-06 | End: 2021-10-07

## 2021-10-06 RX ORDER — SODIUM CHLORIDE, SODIUM LACTATE, POTASSIUM CHLORIDE, CALCIUM CHLORIDE 600; 310; 30; 20 MG/100ML; MG/100ML; MG/100ML; MG/100ML
100 INJECTION, SOLUTION INTRAVENOUS CONTINUOUS
Status: CANCELLED | OUTPATIENT
Start: 2021-10-06

## 2021-10-06 NOTE — PROGRESS NOTES
Patient pre-assessment complete for AV Node ablation with Dr Kelsie Eisenberg scheduled for 10/7/21 at 7:30am, arrival time 6am. Patient verified using . Patient instructed to bring all home medications in labeled bottles on the day of procedure. NPO status reinforced. Patient instructed to HOLD eliquis & losartan in am. Instructed they can take all other medications excluding vitamins & supplements. Patient verbalizes understanding of all instructions & denies any questions at this time.

## 2021-10-07 ENCOUNTER — HOSPITAL ENCOUNTER (OUTPATIENT)
Age: 86
Setting detail: OUTPATIENT SURGERY
Discharge: HOME | End: 2021-10-07
Attending: INTERNAL MEDICINE | Admitting: INTERNAL MEDICINE
Payer: MEDICARE

## 2021-10-07 ENCOUNTER — ANESTHESIA (OUTPATIENT)
Dept: CARDIAC CATH/INVASIVE PROCEDURES | Age: 86
End: 2021-10-07
Payer: MEDICARE

## 2021-10-07 VITALS
TEMPERATURE: 98.6 F | BODY MASS INDEX: 21.47 KG/M2 | RESPIRATION RATE: 14 BRPM | HEART RATE: 90 BPM | OXYGEN SATURATION: 93 % | DIASTOLIC BLOOD PRESSURE: 132 MMHG | HEIGHT: 70 IN | WEIGHT: 150 LBS | SYSTOLIC BLOOD PRESSURE: 149 MMHG

## 2021-10-07 DIAGNOSIS — I48.91 A-FIB (HCC): ICD-10-CM

## 2021-10-07 DIAGNOSIS — E78.00 HYPERCHOLESTEROLEMIA: ICD-10-CM

## 2021-10-07 DIAGNOSIS — N18.30 STAGE 3 CHRONIC KIDNEY DISEASE, UNSPECIFIED WHETHER STAGE 3A OR 3B CKD (HCC): ICD-10-CM

## 2021-10-07 DIAGNOSIS — I10 PRIMARY HYPERTENSION: ICD-10-CM

## 2021-10-07 DIAGNOSIS — I48.11 LONGSTANDING PERSISTENT ATRIAL FIBRILLATION (HCC): ICD-10-CM

## 2021-10-07 LAB
ATRIAL RATE: 102 BPM
CALCULATED R AXIS, ECG10: -50 DEGREES
CALCULATED T AXIS, ECG11: 88 DEGREES
DIAGNOSIS, 93000: NORMAL
Q-T INTERVAL, ECG07: 396 MS
QRS DURATION, ECG06: 128 MS
QTC CALCULATION (BEZET), ECG08: 500 MS
VENTRICULAR RATE, ECG03: 96 BPM

## 2021-10-07 PROCEDURE — 76210000063 HC OR PH I REC FIRST 0.5 HR: Performed by: INTERNAL MEDICINE

## 2021-10-07 PROCEDURE — 77030027107 HC PTCH EXT REF CARTO3 J&J -F: Performed by: INTERNAL MEDICINE

## 2021-10-07 PROCEDURE — 93281 PM DEVICE PROGR EVAL MULTI: CPT | Performed by: INTERNAL MEDICINE

## 2021-10-07 PROCEDURE — 76060000033 HC ANESTHESIA 1 TO 1.5 HR: Performed by: INTERNAL MEDICINE

## 2021-10-07 PROCEDURE — 77030035291 HC TBNG PMP SMARTABLATE J&J -B: Performed by: INTERNAL MEDICINE

## 2021-10-07 PROCEDURE — 74011000250 HC RX REV CODE- 250: Performed by: NURSE ANESTHETIST, CERTIFIED REGISTERED

## 2021-10-07 PROCEDURE — C1732 CATH, EP, DIAG/ABL, 3D/VECT: HCPCS | Performed by: INTERNAL MEDICINE

## 2021-10-07 PROCEDURE — 93650 ICAR CATH ABLTJ AV NODE FUNC: CPT | Performed by: INTERNAL MEDICINE

## 2021-10-07 PROCEDURE — 74011250636 HC RX REV CODE- 250/636: Performed by: ANESTHESIOLOGY

## 2021-10-07 PROCEDURE — 74011250636 HC RX REV CODE- 250/636: Performed by: INTERNAL MEDICINE

## 2021-10-07 PROCEDURE — 93005 ELECTROCARDIOGRAM TRACING: CPT | Performed by: INTERNAL MEDICINE

## 2021-10-07 PROCEDURE — 77030013687 HC GD NDL BARD -B: Performed by: INTERNAL MEDICINE

## 2021-10-07 PROCEDURE — C1760 CLOSURE DEV, VASC: HCPCS | Performed by: INTERNAL MEDICINE

## 2021-10-07 PROCEDURE — 74011250636 HC RX REV CODE- 250/636: Performed by: NURSE ANESTHETIST, CERTIFIED REGISTERED

## 2021-10-07 PROCEDURE — C1894 INTRO/SHEATH, NON-LASER: HCPCS | Performed by: INTERNAL MEDICINE

## 2021-10-07 RX ORDER — LIDOCAINE HYDROCHLORIDE 10 MG/ML
0.1 INJECTION INFILTRATION; PERINEURAL AS NEEDED
Status: DISCONTINUED | OUTPATIENT
Start: 2021-10-07 | End: 2021-10-07 | Stop reason: HOSPADM

## 2021-10-07 RX ORDER — FENTANYL CITRATE 50 UG/ML
100 INJECTION, SOLUTION INTRAMUSCULAR; INTRAVENOUS ONCE
Status: DISCONTINUED | OUTPATIENT
Start: 2021-10-07 | End: 2021-10-07 | Stop reason: HOSPADM

## 2021-10-07 RX ORDER — SODIUM CHLORIDE, SODIUM LACTATE, POTASSIUM CHLORIDE, CALCIUM CHLORIDE 600; 310; 30; 20 MG/100ML; MG/100ML; MG/100ML; MG/100ML
100 INJECTION, SOLUTION INTRAVENOUS CONTINUOUS
Status: DISCONTINUED | OUTPATIENT
Start: 2021-10-07 | End: 2021-10-07 | Stop reason: HOSPADM

## 2021-10-07 RX ORDER — MIDAZOLAM HYDROCHLORIDE 1 MG/ML
2 INJECTION, SOLUTION INTRAMUSCULAR; INTRAVENOUS ONCE
Status: DISCONTINUED | OUTPATIENT
Start: 2021-10-07 | End: 2021-10-07 | Stop reason: HOSPADM

## 2021-10-07 RX ORDER — PROPOFOL 10 MG/ML
INJECTION, EMULSION INTRAVENOUS
Status: DISCONTINUED | OUTPATIENT
Start: 2021-10-07 | End: 2021-10-07 | Stop reason: HOSPADM

## 2021-10-07 RX ORDER — CEFAZOLIN SODIUM/WATER 2 G/20 ML
2 SYRINGE (ML) INTRAVENOUS
Status: COMPLETED | OUTPATIENT
Start: 2021-10-07 | End: 2021-10-07

## 2021-10-07 RX ORDER — PROPOFOL 10 MG/ML
INJECTION, EMULSION INTRAVENOUS AS NEEDED
Status: DISCONTINUED | OUTPATIENT
Start: 2021-10-07 | End: 2021-10-07 | Stop reason: HOSPADM

## 2021-10-07 RX ORDER — LIDOCAINE HYDROCHLORIDE 20 MG/ML
INJECTION, SOLUTION EPIDURAL; INFILTRATION; INTRACAUDAL; PERINEURAL AS NEEDED
Status: DISCONTINUED | OUTPATIENT
Start: 2021-10-07 | End: 2021-10-07 | Stop reason: HOSPADM

## 2021-10-07 RX ORDER — MIDAZOLAM HYDROCHLORIDE 1 MG/ML
2 INJECTION, SOLUTION INTRAMUSCULAR; INTRAVENOUS
Status: DISCONTINUED | OUTPATIENT
Start: 2021-10-07 | End: 2021-10-07 | Stop reason: HOSPADM

## 2021-10-07 RX ADMIN — PHENYLEPHRINE HYDROCHLORIDE 120 MCG: 10 INJECTION INTRAVENOUS at 08:19

## 2021-10-07 RX ADMIN — PROPOFOL 40 MG: 10 INJECTION, EMULSION INTRAVENOUS at 07:48

## 2021-10-07 RX ADMIN — PROPOFOL 100 MCG/KG/MIN: 10 INJECTION, EMULSION INTRAVENOUS at 07:48

## 2021-10-07 RX ADMIN — PROPOFOL 20 MG: 10 INJECTION, EMULSION INTRAVENOUS at 08:04

## 2021-10-07 RX ADMIN — SODIUM CHLORIDE, SODIUM LACTATE, POTASSIUM CHLORIDE, AND CALCIUM CHLORIDE: 600; 310; 30; 20 INJECTION, SOLUTION INTRAVENOUS at 07:40

## 2021-10-07 RX ADMIN — PHENYLEPHRINE HYDROCHLORIDE 120 MCG: 10 INJECTION INTRAVENOUS at 08:07

## 2021-10-07 RX ADMIN — CEFAZOLIN 2 G: 1 INJECTION, POWDER, FOR SOLUTION INTRAVENOUS at 07:52

## 2021-10-07 RX ADMIN — LIDOCAINE HYDROCHLORIDE 60 MG: 20 INJECTION, SOLUTION EPIDURAL; INFILTRATION; INTRACAUDAL; PERINEURAL at 07:48

## 2021-10-07 NOTE — PROCEDURES
: Jaci Almanzar. Luis A Gomez MD     REFERRING: Lesa Macdonald MD     Pre-Procedure Diagnosis  1. Persistent atrial fibrillation. 2. NYHA class III symptoms. Procedure Performed  1. AV node ablation  2. Biventricular pacemaker reprogramming. Anesthesia: MAC      Estimated Blood Loss: Less than 10 mL          Specimens: * No specimens in log *      Complications: None     Fluoroscopy Time: 0 minutes     Contrast: 0 cc      The procedure, indications, risks, benefits, and alternatives were discussed with the patient and family members, who desired to proceed after questions were answered and informed consent was documented. Procedure: After informed consent was obtained, the patient was brought to the Electrophysiology Laboratory in a fasting state and was prepped and draped in sterile fashion. The biventricular pacemaker was reprogrammed to VVI 40 bpm. Local anesthetic was delivered to the region over the right femoral vein. Access x 1 was obtained under ultrasound guidance using a modified Seldinger technique with placement of a short 8 F sheath into the right femoral vein. A 3.5 mm STSF ablation catheter (BiosBLUEPHOENIX-Ruiz) was then advanced in the right atrium and a 3D electroanatomic map focusing on the [address] was made. The catheter was then advanced to the region of the AV junction and with delivery of RF there the appearance of accelerated junctional beats followed by complete heart block. A 20 minute waiting period was then observed and there was no return of AV conduction. The patient tolerated the procedure well and there were no complications. He was allowed to awake from anesthesia and transferred to the recovery area in stable condition. The device was reprogrammed to DDD-CLS 90 bpm.    IMPRESSION: Successful AV node ablation with pacemaker reprogramming. PLAN:  -Same-day discharge, use of VASCADE MVP.    -Routine CIED instructions.  -Device clinic follow up in 1-2 weeks.  -Ok to restart Eliquis.   -Routine cardiac care per Dr. Tania Peraza. Enrique Ryan.  Ya Hayes MD, [address] [facility] Cardiac Electrophysiology  [facility] Cardiology

## 2021-10-07 NOTE — H&P
[facility] History & Physical                   Assessment/Plan:   Principal Problem:    A-fib ([address] (10/1/2021)      Overview: Added automatically from request for surgery [id number]      EF 50-55%      S/p BIV pacemaker (BTK). 80year old male with a history of BIV PPM for AF with RVR who presents for AV node ablation. He has undergone informed consent and will proceed with planned procedure under MAC. -Plan for AV node ablation. PM/CRT & AVN Ablation  I discussed in detail the potential benefits of PM/CRT therapy with AV garrison ablation, including definitive rate control with possible improved mortality, prevention of SCD,  decreased hospitalization, beneficial cardiac remodeling, and prevention of disease progression. Additionally, I discussed with the patient the potential risks of PM/CRT implantation and subsequent AVN ablation, including the risk of bleeding, infection, venous occlusion, DVT/PE, pneumothorax, cardiac tamponade, perforation, need for urgent open heart surgery, device/lead failure or lead dislodgement with pacemaker dependence, inability to place an LV lead via the coronary sinus, inappropriate shock(s), heart attack, stroke, arrhythmia, radiation skin injury, kidney damage/failure, oversedation, respiratory arrest, and even death. The patient understands these risks in the context of the potential benefits of the device implantation and subsequent ablation, and agrees to proceed. TOTAL TIME: 25 minutes, >50% during counseling and coordination of care    [employer]. Seble Dash MD, MS  Clinical Cardiac Electrophysiology  [facility] Cardiology    10/7/2021  7:13 AM      Subjective:     Patient is a 80 y.o. male who presents with AF with RVR which is drug refractory. He underwent a CRT-P with  [facility] earlier in the month and presents today for an AV node ablation in the mgmt of AF. He has stable lead function.  The patient otherwise denies chest pain, dyspnea, presyncope, syncope or lateralizing symptoms. Past Medical History:   Diagnosis Date    Atrial flutter ([facility] [address]     Coronary artery disease     MI    Dementia ([facility] [address]     Hypercholesterolemia     Hypertension     S/P PTCA (percutaneous transluminal coronary angioplasty)     Seizure disorder (HCC)       Past Surgical History:   Procedure Laterality Date    HX PACEMAKER      Pacemaker      No Known Allergies  Social History     Tobacco Use    Smoking status: Never Smoker    Smokeless tobacco: Never Used   Substance Use Topics    Alcohol use: No      FH: History reviewed. No pertinent family history. Review of Systems   Constitutional: Positive for malaise/fatigue. HENT: Negative. Eyes: Negative. Respiratory: Positive for shortness of breath. Cardiovascular: Positive for palpitations. Gastrointestinal: Negative. Genitourinary: Negative. Musculoskeletal: Negative. Skin: Negative. Neurological: Negative. Endo/Heme/Allergies: Negative. Psychiatric/Behavioral: Negative. Objective:       Visit Vitals  BP (!) 123/100 (BP 1 Location: Left upper arm, BP Patient Position: At rest)   Pulse (!) 111   Temp 98.6 °F (37 °C)   Ht 5' 10\" (1.778 m)   Wt 150 lb (68 kg)   SpO2 97%   BMI 21.52 kg/m²       No intake/output data recorded. No intake/output data recorded. Physical Exam  Vitals and nursing note reviewed. Constitutional:       Comments: Geriatric pt, cachectic in appearance. HENT:      Head: Normocephalic. Mouth/Throat:      Pharynx: No oropharyngeal exudate. Eyes:      General: No scleral icterus. Pupils: Pupils are equal, round, and reactive to light. Neck:      Vascular: No JVD. Cardiovascular:      Rate and Rhythm: Tachycardia present. Rhythm irregular. Heart sounds: Normal heart sounds. No murmur heard. No friction rub. No gallop. Pulmonary:      Effort: Pulmonary effort is normal. No respiratory distress. Breath sounds: Normal breath sounds. No wheezing or rales. Abdominal:      General: Bowel sounds are normal. There is no distension. Palpations: Abdomen is soft. Tenderness: There is no abdominal tenderness. There is no rebound. Musculoskeletal:         General: No tenderness. Normal range of motion. Cervical back: Normal range of motion and neck supple. Skin:     General: Skin is warm and dry. Findings: No erythema or rash. Neurological:      Mental Status: He is alert and oriented to person, place, and time. Cranial Nerves: No cranial nerve deficit. Motor: No abnormal muscle tone. Coordination: Coordination normal.   Psychiatric:         Mood and Affect: Affect normal.         Judgment: Judgment normal.       ECG: AF, LBBB, LAD.       Data Review:   Labs:   Recent Labs     10/04/21  1605   *   K 4.4   MG 2.3   BUN 29*   CREA 1.51*   GLU 92   WBC 7.2   HGB 10.8*   HCT 36.0*      INR 1.4

## 2021-10-07 NOTE — ANESTHESIA PREPROCEDURE EVALUATION
Relevant Problems   No relevant active problems       Anesthetic History   No history of anesthetic complications            Review of Systems / Medical History  Patient summary reviewed, nursing notes reviewed and pertinent labs reviewed    Pulmonary  Within defined limits                 Neuro/Psych   Within defined limits           Cardiovascular    Hypertension: well controlled        Dysrhythmias : atrial fibrillation  CAD and cardiac stents    Exercise tolerance: >4 METS     GI/Hepatic/Renal  Within defined limits              Endo/Other      Hypothyroidism: well controlled       Other Findings            Physical Exam    Airway  Mallampati: II  TM Distance: 4 - 6 cm  Neck ROM: normal range of motion   Mouth opening: Normal     Cardiovascular  Regular rate and rhythm,  S1 and S2 normal,  no murmur, click, rub, or gallop             Dental  No notable dental hx       Pulmonary  Breath sounds clear to auscultation               Abdominal         Other Findings            Anesthetic Plan    ASA: 3  Anesthesia type: total IV anesthesia          Induction: Intravenous  Anesthetic plan and risks discussed with: Patient

## 2021-10-07 NOTE — ANESTHESIA POSTPROCEDURE EVALUATION
Procedure(s):  ABLATION AV NODE.    total IV anesthesia    Anesthesia Post Evaluation      Multimodal analgesia: multimodal analgesia used between 6 hours prior to anesthesia start to PACU discharge  Patient location during evaluation: PACU  Patient participation: complete - patient participated  Level of consciousness: awake and awake and alert  Pain management: adequate  Airway patency: patent  Anesthetic complications: no  Cardiovascular status: acceptable  Respiratory status: acceptable  Hydration status: acceptable  Post anesthesia nausea and vomiting:  controlled      INITIAL Post-op Vital signs:   Vitals Value Taken Time   /125 10/07/21 0916   Temp     Pulse 90 10/07/21 0923   Resp     SpO2 92 % 10/07/21 0923   Vitals shown include unvalidated device data.

## 2021-10-07 NOTE — Clinical Note
Sheath #all: Dressed using 4 X 4, gauze and tape dressing. Site: clean, dry, & intact, no bleeding and no hematoma.

## 2021-10-07 NOTE — PROGRESS NOTES
Pt arrived, ambulated to room with no visible problems, planned AV Node Ablation for Dr Les Sanchez. Consent signed, Procedure discussed with pt all questions answered voiced understanding. Medications and history discussed with pt. Pt prepped per ordersThe patient has a fraility score of 5-MILDLY FRAIL, based on age.

## 2021-10-07 NOTE — Clinical Note
Right femoral vein. Accessed successfully. Femoral access needle used. Using ultrasound guidance.  Number of attempts =  1. 8fr sheath

## 2021-10-07 NOTE — DISCHARGE INSTRUCTIONS
Ablation Discharge Instructions    *Check the puncture site frequently for swelling or bleeding. If you see any bleeding, lie down and apply pressure over the area with a clean town or washcloth. Notify your doctor for any redness, swelling, drainage or oozing from the puncture site. Notify your doctor for any fever or chills. *If the leg with the puncture becomes cold, numb or painful, call [facility] at  [phone]. *Activity should be limited for the next 48 hours. Climb stairs as little as possible and avoid any stooping, bending or strenuous activity for 48 hours. No heavy lifting (anything over 10 pounds) for three days. *Do not drive for 48 hours. *You may resume your usual diet. Drink more fluids than usual.    *Have a responsible person drive you home and stay with you for at least 24 hours after your ablation. *You may remove the bandage from your Right, Left Groin in 24 hours. You may shower in 24 hours. No tub baths, hot tubs or swimming for one week. Do not place any lotions, creams, powders, ointments over the puncture site for one week. You may place a clean band-aid over the puncture site each day for 5 days. Change this daily. 1) Ok to restart Eliquis for now. 2) Call office with any questions. 3) Relax (no heavy lifting/working) for next 48-72 hours. 4) Device clinic in 1-2 weeks. 5) Return care to Dr. Leni Myers.

## 2021-10-07 NOTE — PROGRESS NOTES
Report received from [employer]. Procedural findings communicated. Intra procedural  medication administration reviewed. Progression of care discussed.      Patient received into [address] post sheath removal.     Access site without bleeding or swelling     Dressing dry and intact     Patient instructed to limit movement to right lower extremity    Routine post procedural vital signs and site assessment initiated

## 2022-03-19 PROBLEM — I48.91 A-FIB (HCC): Status: ACTIVE | Noted: 2021-10-01

## 2022-03-19 PROBLEM — N18.30 STAGE 3 CHRONIC KIDNEY DISEASE (HCC): Status: ACTIVE | Noted: 2021-09-27

## 2022-08-29 ENCOUNTER — OFFICE VISIT (OUTPATIENT)
Dept: CARDIOLOGY CLINIC | Age: 87
End: 2022-08-29
Payer: MEDICARE

## 2022-08-29 VITALS
BODY MASS INDEX: 18.9 KG/M2 | DIASTOLIC BLOOD PRESSURE: 80 MMHG | WEIGHT: 132 LBS | HEIGHT: 70 IN | HEART RATE: 80 BPM | SYSTOLIC BLOOD PRESSURE: 120 MMHG

## 2022-08-29 DIAGNOSIS — I10 PRIMARY HYPERTENSION: ICD-10-CM

## 2022-08-29 DIAGNOSIS — I48.21 PERMANENT ATRIAL FIBRILLATION (HCC): Primary | ICD-10-CM

## 2022-08-29 PROCEDURE — 1123F ACP DISCUSS/DSCN MKR DOCD: CPT | Performed by: INTERNAL MEDICINE

## 2022-08-29 PROCEDURE — G8428 CUR MEDS NOT DOCUMENT: HCPCS | Performed by: INTERNAL MEDICINE

## 2022-08-29 PROCEDURE — G8420 CALC BMI NORM PARAMETERS: HCPCS | Performed by: INTERNAL MEDICINE

## 2022-08-29 PROCEDURE — 1036F TOBACCO NON-USER: CPT | Performed by: INTERNAL MEDICINE

## 2022-08-29 PROCEDURE — 99214 OFFICE O/P EST MOD 30 MIN: CPT | Performed by: INTERNAL MEDICINE

## 2022-08-29 RX ORDER — BENZONATATE 200 MG/1
CAPSULE ORAL
COMMUNITY
Start: 2022-08-18

## 2022-08-29 NOTE — PROGRESS NOTES
[facility] CARDIOLOGY  [address], [address] [facility], [address] [place] [address]  PHONE: [phone]        22        NAME:  Matt Hawthorne  : 1932  MRN: [id number]     CHIEF COMPLAINT:    Atrial Fibrillation         SUBJECTIVE:     Now on hospice home care due to dementia. No sob. + weight loss. Medications were all reviewed with the patient today and updated as necessary. Current Outpatient Medications   Medication Sig    benzonatate (TESSALON) 200 MG capsule     apixaban (ELIQUIS) 2.5 MG TABS tablet Take 2.5 mg by mouth 2 times daily    levothyroxine (SYNTHROID) 50 MCG tablet 75 mcg TAKE 1 TABLET BY MOUTH DAILY BEFORE BREAKFAST    losartan (COZAAR) 100 MG tablet Take 100 mg by mouth daily    metoprolol succinate (TOPROL XL) 50 MG extended release tablet Take 50 mg by mouth daily     No current facility-administered medications for this visit. No Known Allergies        PHYSICAL EXAM:     Wt Readings from Last 3 Encounters:   22 132 lb (59.9 kg)   10/29/21 143 lb (64.9 kg)   21 149 lb (67.6 kg)     BP Readings from Last 3 Encounters:   22 120/80   10/29/21 (!) 150/80   21 130/70       /80   Pulse 80   Ht 5' 10\" (1.778 m)   Wt 132 lb (59.9 kg)   BMI 18.94 kg/m²     Physical Exam  Vitals reviewed. Constitutional:       Appearance: He is ill-appearing. HENT:      Head: Normocephalic and atraumatic. Eyes:      Extraocular Movements: Extraocular movements intact. Pupils: Pupils are equal, round, and reactive to light. Cardiovascular:      Rate and Rhythm: Normal rate. Heart sounds: Normal heart sounds. Pulmonary:      Effort: Pulmonary effort is normal.      Breath sounds: Normal breath sounds. Abdominal:      General: Abdomen is flat. Palpations: Abdomen is soft. There is no mass. Musculoskeletal:         General: Normal range of motion. Cervical back: Normal range of motion. Skin:     General: Skin is warm and dry. Neurological:      General: No focal deficit present. Mental Status: He is alert and oriented to person, place, and time. Psychiatric:         Mood and Affect: Mood normal.         RECENT LABS AND RECORDS REVIEW          ASSESSMENT and PLAN    1. Chronic atrial fibrillation ([address]         2. Presence of biventricular cardiac pacemaker         3. S/P AV arina ablation    4. Dementia    ////    CV status is stable. Medications and most recent labs reviewed. We talked about appetite stimulation to discuss w/ PCP    Greater  than 50% of today's visit was devoted to counseling the patient, explaining disease concepts and offering advice and suggestions for optimal care. There are no diagnoses linked to this encounter. Return in about 1 year (around 8/29/2023).        Aguilar Honeycutt MD  8/29/2022  9:57 PM

## 2023-11-09 NOTE — Clinical Note
History and physical documented and up to date, allergies reviewed, lab results reviewed, pre-procedure education provided, patient verbalized understanding of procedure, procedural consent signed and patient is NPO. 79

## (undated) DEVICE — HI-TORQUE VERSACORE MODIFIED J GUIDE WIRE SYSTEM 145 CM: Brand: HI-TORQUE VERSACORE

## (undated) DEVICE — IMMOB SHLDR W/WAIST STRP M --

## (undated) DEVICE — SYSTEM CLOSURE 6-12 FR VEN VASC VASCADE MVP

## (undated) DEVICE — 18G NG KIT WITH 96IN PROBE COVER (10 PK): Brand: SITE-RITE

## (undated) DEVICE — GUIDE NDL ST W/ 14 X 147CM TELESCOPICALLY FLD CIV FLX CVR

## (undated) DEVICE — INTRO PEELWY HEMVLV 6F 13CM -- SHRT PRELUDE SNAP

## (undated) DEVICE — SYSTEM INTRO 9.5FR L13CM STD WHT CAP HEMSTAT SPLITTABLE

## (undated) DEVICE — SUTURE TICRON SZ 0 L36IN NONABSORBABLE BLU CV 300 L35MM 1 2 8886339361

## (undated) DEVICE — INTRODUCER GUIDEWIRE 0.010-0.018 IN

## (undated) DEVICE — Z DUPLICATE USE 2275497 DRSG POSTOP PRMSL AG 3.5X6IN

## (undated) DEVICE — MICROPUNCTURE INTRODUCER SET SILHOUETTE TRANSITIONLESS WITH NITINOL WIRE GUIDE: Brand: MICROPUNCTURE

## (undated) DEVICE — CATHETER ABLAT 8FR L115CM 1-6-2MM SPC TIP 3.5MM FJ CRV

## (undated) DEVICE — SUTURE VCRL SZ 3-0 L27IN ABSRB UD L36MM CT-1 1/2 CIR J258H

## (undated) DEVICE — TUBE SET IRR PUMP THERMALCOOL -- SMARTABLATE

## (undated) DEVICE — PATCH CARTO 3 EXT REF --

## (undated) DEVICE — GUIDEWIRE VASC STR 3 CM 0.014 INX190 CM HI TORQ WHISPER ES

## (undated) DEVICE — RADIFOCUS GLIDEWIRE: Brand: GLIDEWIRE

## (undated) DEVICE — PINNACLE TIF INTRODUCER SHEATH: Brand: PINNACLE